# Patient Record
Sex: MALE | Race: WHITE | NOT HISPANIC OR LATINO | URBAN - METROPOLITAN AREA
[De-identification: names, ages, dates, MRNs, and addresses within clinical notes are randomized per-mention and may not be internally consistent; named-entity substitution may affect disease eponyms.]

---

## 2019-11-19 ENCOUNTER — OFFICE VISIT CONVERTED (OUTPATIENT)
Dept: UROLOGY | Facility: CLINIC | Age: 39
End: 2019-11-19
Attending: NURSE PRACTITIONER

## 2019-12-18 ENCOUNTER — PROCEDURE VISIT CONVERTED (OUTPATIENT)
Dept: UROLOGY | Facility: CLINIC | Age: 39
End: 2019-12-18
Attending: UROLOGY

## 2020-10-13 ENCOUNTER — OFFICE VISIT CONVERTED (OUTPATIENT)
Dept: UROLOGY | Facility: CLINIC | Age: 40
End: 2020-10-13
Attending: UROLOGY

## 2021-05-10 NOTE — PROCEDURES
Procedure Note      Patient Name: Angel Perez   Patient ID: [id number]   Sex: Male   YOB: 1980    Primary Care Provider: Krista DAS   Referring Provider: Krista DAS    Visit Date: October 13, 2020    Provider: Polly Mcintyre MD   Location: [facility]   Location Address: [address] [place] [address], [address] [place] [address]  [id number]   Location Phone: [phone]          Patient presents to the office for semen analysis. He is 6-8 weeks status post vasectomy.   This is the patient's first semen specimen.   Semen Analysis under microscopic examination: Sperm NOT PRESENT, I told him. kq           Assessment  · Sterilization     V25.2/Z30.2      Plan  · Orders  o Semen Analysis (Presence & Motility of Sperm) (88271) - V25.2/Z30.2 - 10/13/2020            Electronically Signed by: HOLLY Santos -Author on October 13, 2020 02:25:51 PM  Electronically Co-signed by: Polly Mcintyre MD -Reviewer on October 14, 2020 11:54:46 AM

## 2021-05-15 VITALS
DIASTOLIC BLOOD PRESSURE: 72 MMHG | SYSTOLIC BLOOD PRESSURE: 121 MMHG | HEIGHT: 69 IN | HEART RATE: 77 BPM | TEMPERATURE: 98.9 F | BODY MASS INDEX: 25.92 KG/M2 | WEIGHT: 175 LBS